# Patient Record
Sex: MALE | URBAN - NONMETROPOLITAN AREA
[De-identification: names, ages, dates, MRNs, and addresses within clinical notes are randomized per-mention and may not be internally consistent; named-entity substitution may affect disease eponyms.]

---

## 2019-05-24 ENCOUNTER — NURSE ONLY (OUTPATIENT)
Dept: LAB | Age: 39
End: 2019-05-24

## 2019-05-24 LAB
CHOLESTEROL, TOTAL: 199 MG/DL (ref 100–199)
GLUCOSE FASTING: 102 MG/DL (ref 70–108)
HDLC SERPL-MCNC: 45 MG/DL
LDL CHOLESTEROL CALCULATED: 139 MG/DL
TRIGL SERPL-MCNC: 77 MG/DL (ref 0–199)

## 2021-08-05 ENCOUNTER — NURSE ONLY (OUTPATIENT)
Dept: LAB | Age: 41
End: 2021-08-05

## 2021-08-05 LAB
CHOLESTEROL, TOTAL: 250 MG/DL (ref 100–199)
GLUCOSE BLD-MCNC: 92 MG/DL (ref 70–108)
HDLC SERPL-MCNC: 46 MG/DL
LDL CHOLESTEROL CALCULATED: 177 MG/DL
TRIGL SERPL-MCNC: 137 MG/DL (ref 0–199)

## 2021-12-09 ENCOUNTER — HOSPITAL ENCOUNTER (INPATIENT)
Age: 41
LOS: 3 days | Discharge: HOME | DRG: 177 | End: 2021-12-12
Attending: STUDENT IN AN ORGANIZED HEALTH CARE EDUCATION/TRAINING PROGRAM | Admitting: INTERNAL MEDICINE
Payer: COMMERCIAL

## 2021-12-09 ENCOUNTER — APPOINTMENT (OUTPATIENT)
Dept: GENERAL RADIOLOGY | Age: 41
DRG: 177 | End: 2021-12-09
Payer: COMMERCIAL

## 2021-12-09 DIAGNOSIS — J12.82 PNEUMONIA DUE TO COVID-19 VIRUS: Primary | ICD-10-CM

## 2021-12-09 DIAGNOSIS — R09.02 HYPOXIA: ICD-10-CM

## 2021-12-09 DIAGNOSIS — U07.1 PNEUMONIA DUE TO COVID-19 VIRUS: Primary | ICD-10-CM

## 2021-12-09 LAB
ANION GAP SERPL CALCULATED.3IONS-SCNC: 16 MEQ/L (ref 8–16)
BASOPHILS # BLD: 0 %
BASOPHILS ABSOLUTE: 0 THOU/MM3 (ref 0–0.1)
BUN BLDV-MCNC: 13 MG/DL (ref 7–22)
CALCIUM SERPL-MCNC: 8.9 MG/DL (ref 8.5–10.5)
CHLORIDE BLD-SCNC: 102 MEQ/L (ref 98–111)
CO2: 20 MEQ/L (ref 23–33)
CREAT SERPL-MCNC: 0.9 MG/DL (ref 0.4–1.2)
EOSINOPHIL # BLD: 0 %
EOSINOPHILS ABSOLUTE: 0 THOU/MM3 (ref 0–0.4)
ERYTHROCYTE [DISTWIDTH] IN BLOOD BY AUTOMATED COUNT: 12.7 % (ref 11.5–14.5)
ERYTHROCYTE [DISTWIDTH] IN BLOOD BY AUTOMATED COUNT: 42.1 FL (ref 35–45)
FERRITIN: 920 NG/ML (ref 22–322)
GFR SERPL CREATININE-BSD FRML MDRD: > 90 ML/MIN/1.73M2
GLUCOSE BLD-MCNC: 125 MG/DL (ref 70–108)
HCT VFR BLD CALC: 44.8 % (ref 42–52)
HEMOGLOBIN: 14.8 GM/DL (ref 14–18)
IMMATURE GRANS (ABS): 0.03 THOU/MM3 (ref 0–0.07)
IMMATURE GRANULOCYTES: 0.4 %
LD: 564 U/L (ref 100–190)
LYMPHOCYTES # BLD: 8.8 %
LYMPHOCYTES ABSOLUTE: 0.7 THOU/MM3 (ref 1–4.8)
MCH RBC QN AUTO: 30 PG (ref 26–33)
MCHC RBC AUTO-ENTMCNC: 33 GM/DL (ref 32.2–35.5)
MCV RBC AUTO: 90.9 FL (ref 80–94)
MONOCYTES # BLD: 5.5 %
MONOCYTES ABSOLUTE: 0.5 THOU/MM3 (ref 0.4–1.3)
NUCLEATED RED BLOOD CELLS: 0 /100 WBC
OSMOLALITY CALCULATION: 277.3 MOSMOL/KG (ref 275–300)
PLATELET # BLD: 320 THOU/MM3 (ref 130–400)
PMV BLD AUTO: 8.8 FL (ref 9.4–12.4)
POTASSIUM REFLEX MAGNESIUM: 3.9 MEQ/L (ref 3.5–5.2)
PROCALCITONIN: 0.16 NG/ML (ref 0.01–0.09)
RBC # BLD: 4.93 MILL/MM3 (ref 4.7–6.1)
SEG NEUTROPHILS: 85.3 %
SEGMENTED NEUTROPHILS ABSOLUTE COUNT: 7 THOU/MM3 (ref 1.8–7.7)
SODIUM BLD-SCNC: 138 MEQ/L (ref 135–145)
TROPONIN T: < 0.01 NG/ML
WBC # BLD: 8.2 THOU/MM3 (ref 4.8–10.8)

## 2021-12-09 PROCEDURE — 85025 COMPLETE CBC W/AUTO DIFF WBC: CPT

## 2021-12-09 PROCEDURE — 83615 LACTATE (LD) (LDH) ENZYME: CPT

## 2021-12-09 PROCEDURE — 82728 ASSAY OF FERRITIN: CPT

## 2021-12-09 PROCEDURE — 71046 X-RAY EXAM CHEST 2 VIEWS: CPT

## 2021-12-09 PROCEDURE — 6360000002 HC RX W HCPCS: Performed by: STUDENT IN AN ORGANIZED HEALTH CARE EDUCATION/TRAINING PROGRAM

## 2021-12-09 PROCEDURE — 93005 ELECTROCARDIOGRAM TRACING: CPT | Performed by: STUDENT IN AN ORGANIZED HEALTH CARE EDUCATION/TRAINING PROGRAM

## 2021-12-09 PROCEDURE — 84484 ASSAY OF TROPONIN QUANT: CPT

## 2021-12-09 PROCEDURE — 99205 OFFICE O/P NEW HI 60 MIN: CPT

## 2021-12-09 PROCEDURE — 84145 PROCALCITONIN (PCT): CPT

## 2021-12-09 PROCEDURE — 99223 1ST HOSP IP/OBS HIGH 75: CPT | Performed by: PHYSICIAN ASSISTANT

## 2021-12-09 PROCEDURE — 6370000000 HC RX 637 (ALT 250 FOR IP): Performed by: STUDENT IN AN ORGANIZED HEALTH CARE EDUCATION/TRAINING PROGRAM

## 2021-12-09 PROCEDURE — 2580000003 HC RX 258: Performed by: STUDENT IN AN ORGANIZED HEALTH CARE EDUCATION/TRAINING PROGRAM

## 2021-12-09 PROCEDURE — 1200000003 HC TELEMETRY R&B

## 2021-12-09 PROCEDURE — 94640 AIRWAY INHALATION TREATMENT: CPT

## 2021-12-09 PROCEDURE — 80048 BASIC METABOLIC PNL TOTAL CA: CPT

## 2021-12-09 PROCEDURE — 99283 EMERGENCY DEPT VISIT LOW MDM: CPT

## 2021-12-09 PROCEDURE — 36415 COLL VENOUS BLD VENIPUNCTURE: CPT

## 2021-12-09 PROCEDURE — 94760 N-INVAS EAR/PLS OXIMETRY 1: CPT

## 2021-12-09 PROCEDURE — 2700000000 HC OXYGEN THERAPY PER DAY

## 2021-12-09 RX ORDER — DEXAMETHASONE SODIUM PHOSPHATE 4 MG/ML
10 INJECTION, SOLUTION INTRA-ARTICULAR; INTRALESIONAL; INTRAMUSCULAR; INTRAVENOUS; SOFT TISSUE ONCE
Status: DISCONTINUED | OUTPATIENT
Start: 2021-12-09 | End: 2021-12-09

## 2021-12-09 RX ORDER — IPRATROPIUM BROMIDE AND ALBUTEROL SULFATE 2.5; .5 MG/3ML; MG/3ML
1 SOLUTION RESPIRATORY (INHALATION) ONCE
Status: COMPLETED | OUTPATIENT
Start: 2021-12-09 | End: 2021-12-09

## 2021-12-09 RX ORDER — DEXAMETHASONE SODIUM PHOSPHATE 4 MG/ML
6 INJECTION, SOLUTION INTRA-ARTICULAR; INTRALESIONAL; INTRAMUSCULAR; INTRAVENOUS; SOFT TISSUE ONCE
Status: COMPLETED | OUTPATIENT
Start: 2021-12-09 | End: 2021-12-09

## 2021-12-09 RX ORDER — 0.9 % SODIUM CHLORIDE 0.9 %
1000 INTRAVENOUS SOLUTION INTRAVENOUS ONCE
Status: COMPLETED | OUTPATIENT
Start: 2021-12-09 | End: 2021-12-09

## 2021-12-09 RX ADMIN — SODIUM CHLORIDE 1000 ML: 9 INJECTION, SOLUTION INTRAVENOUS at 12:13

## 2021-12-09 RX ADMIN — DEXAMETHASONE SODIUM PHOSPHATE 6 MG: 4 INJECTION, SOLUTION INTRA-ARTICULAR; INTRALESIONAL; INTRAMUSCULAR; INTRAVENOUS; SOFT TISSUE at 12:13

## 2021-12-09 RX ADMIN — IPRATROPIUM BROMIDE AND ALBUTEROL SULFATE 1 AMPULE: .5; 3 SOLUTION RESPIRATORY (INHALATION) at 13:21

## 2021-12-09 ASSESSMENT — ENCOUNTER SYMPTOMS
RHINORRHEA: 0
ABDOMINAL PAIN: 0
SHORTNESS OF BREATH: 1
WHEEZING: 0
BACK PAIN: 0
COUGH: 1
NAUSEA: 0
DIARRHEA: 1
RHINORRHEA: 1
EYE REDNESS: 0
SINUS PAIN: 0
VOMITING: 0
COLOR CHANGE: 0
SORE THROAT: 0

## 2021-12-09 NOTE — ED NOTES
Patient resting in bed. Respirations easy and unlabored. No distress noted. Call light within reach.        Sissy Lagos RN  12/09/21 3907

## 2021-12-09 NOTE — ED PROVIDER NOTES
[address] [facility] MEDICINE ATTENDING ATTESTATION      Evaluation of Cabrera Motta. Case discussed and care plan developed with resident physician. I agree with the resident physician documentation and plan as documented by him, except if my documentation differs. Patient seen, interviewed and examined by me. I reviewed the medical, surgical, family and social history, medications and allergies. I have reviewed the nursing documentation. I have reviewed the patient's vital signs and are abnormal from Mild tachycardia and tachypnea per my interpretation. There is no height or weight on file to calculate BMI. Pulsoxymetry is abnormal per my interpretation. Brief H&P   Patient c/o Covid-like symptoms for 10 days, desaturated to 88% with ambulation at urgent care and was referred to the emergency department    Physical exam is notable for ill-appearing, tachypnea, diminished breath sounds in bilateral bases, tachycardia, dry mucous membranes      Medical Decision Making   MDM:   Patient is a 78-year-old male who presents with 10 days of Covid symptoms. Patient with tachycardia, tachypnea, hypoxia to 88% on room air requiring 2 L supplemental oxygen. Patient given IV fluids, chest x-ray consistent with known Covid infection. Plan:    Admit for acute hypoxic respiratory failure secondary to Covid    Please see the resident physician completed note for final disposition except as documented on this attestation. I have reviewed and interpreted all available lab, radiology and ekg results available at the moment. Diagnosis, treatment and disposition plans were discussed and agreed upon by patient. This transcription was electronically signed. It was dictated by use of voice recognition software and electronically transcribed. The transcription may contain errors not detected in proofreading.      I performed direct supervision and was present for the critical portion following procedures: None  Critical care time on this case: None    Electronically signed by Danyelle Freed MD on 12/9/21 at 1:44 PM EST       Danyelle Freed MD  12/09/21 3908

## 2021-12-09 NOTE — ED PROVIDER NOTES
[facility] [address] [address] EMERGENCY DEPT  Urgent Care Encounter       CHIEF COMPLAINT       Chief Complaint   Patient presents with    Positive For Covid-19     shortness of breath       Nurses Notes reviewed and I agree except as noted in the HPI. HISTORY OF PRESENT ILLNESS   Ravi Saavedra is a 37 y.o. male who presents for cough, shortness of breath, fatigue, decreased energy. Symptoms for approximately 10 days but the symptoms have gotten worse today. Patient is Covid positive. He has tested positive a week ago. He has not had any respiratory distress until today. HPI    REVIEW OF SYSTEMS     Review of Systems   Constitutional: Positive for fatigue and fever. HENT: Positive for congestion and rhinorrhea. Respiratory: Positive for cough and shortness of breath. Negative for wheezing. Cardiovascular: Negative for chest pain. Skin: Negative for color change. Neurological: Positive for headaches. Negative for dizziness. PAST MEDICAL HISTORY   History reviewed. No pertinent past medical history. SURGICALHISTORY     Patient  has no past surgical history on file. CURRENT MEDICATIONS       Previous Medications    No medications on file       ALLERGIES     Patient is has No Known Allergies. Patients   There is no immunization history on file for this patient. FAMILY HISTORY     Patient's family history is not on file. SOCIAL HISTORY     Patient  reports that he has never smoked. He has never used smokeless tobacco. He reports previous alcohol use. He reports previous drug use. PHYSICAL EXAM     ED TRIAGE VITALS  BP: 108/67, Temp: 98.9 °F (37.2 °C), Pulse: 112, Resp: 26, SpO2: 93 %,There is no height or weight on file to calculate BMI.,No LMP for male patient. Physical Exam  Constitutional:       Appearance: He is normal weight. He is ill-appearing. He is not toxic-appearing. HENT:      Head: Normocephalic. Cardiovascular:      Rate and Rhythm: Tachycardia present.       Pulses: Normal pulses. Heart sounds: Normal heart sounds. Pulmonary:      Effort: Tachypnea present. No respiratory distress. Breath sounds: Decreased air movement present. Decreased breath sounds present. Comments: Few scattered end expiratory wheezes noted throughout, diminished breath sounds throughout  Musculoskeletal:      Cervical back: Normal range of motion. Skin:     General: Skin is warm. Capillary Refill: Capillary refill takes less than 2 seconds. Neurological:      General: No focal deficit present. Mental Status: He is alert. DIAGNOSTIC RESULTS     Labs:No results found for this visit on 12/09/21. IMAGING:    XR CHEST (2 VW)   Final Result   Multifocal interstitial and alveolar opacities consistent with pneumonia the appropriate clinical setting. Follow-up to ensure resolution is advised. **This report has been created using voice recognition software. It may contain minor errors which are inherent in voice recognition technology. **      Final report electronically signed by Dr Иван Maharaj on 12/9/2021 11:09 AM            EKG:      URGENT CARE COURSE:     Vitals:    12/09/21 1033   BP: 108/67   Pulse: 112   Resp: 26   Temp: 98.9 °F (37.2 °C)   TempSrc: Temporal   SpO2: 93%   Weight: 200 lb (90.7 kg)       Medications - No data to display         PROCEDURES:  None    FINAL IMPRESSION      1. Pneumonia due to COVID-19 virus    2. Hypoxia          DISPOSITION/ PLAN     Patient presents for dyspnea, hypoxia. Patient is Covid positive. Chest x-ray today shows multifocal interstitial and alveolar opacities significant for pneumonia. Patient has respirations 26 to 28/min. O2 saturation at rest 93%. Patient needs further evaluation for his hypoxia. He may benefit from Regeneron although this is day 10 of his symptoms. After discussion, the patient is amenable to being transferred to [facility] emergency department for further evaluation.   I did contact

## 2021-12-09 NOTE — PROGRESS NOTES
Patient released in stable condition. Instructed to go directly to [facility] emergency department for further evaluation and treatment. Patient verbalized understanding. Ambulated, per self, to private car.

## 2021-12-09 NOTE — ED TRIAGE NOTES
Presents to ED from urgent care with c/o sob. Patient had positive covid test 9 days ago. Ambulatory pulse ox 88%.

## 2021-12-09 NOTE — ED PROVIDER NOTES
[facility] ENCOUNTER          Pt Name: Iliana Whyte  MRN: [id number]  [place] 1980  Date of evaluation: 12/9/2021  Treating Resident Physician: Brian Moser MD  Supervising Physician: Dr Zeus Valenzuela       Chief Complaint   Patient presents with    Positive For Covid-19     shortness of breath     History obtained from the patient. HISTORY OF PRESENT ILLNESS    HPI  Iliana Whyte is a 37 y.o. male with no past medical history who was seen in urgent care prior to arrival here for his symptoms and sent here due to him being hypoxic to 88% when ambulating requiring supplemental oxygenation. Patient began having symptoms of fatigue myalgias fever chills and loose/watery diarrhea over the past 10 days, tested positive for COVID-19 9 days ago. However over the past 2 days has had worsening dyspnea exertion shortness of breath and nonproductive cough. Denies any chest pain palpitations or abdominal pain. The patient has no other acute complaints at this time. REVIEW OF SYSTEMS   Review of Systems   Constitutional: Positive for chills, fatigue and fever. HENT: Negative for rhinorrhea, sinus pain and sore throat. Eyes: Negative for redness. Respiratory: Positive for cough and shortness of breath. Cardiovascular: Negative for chest pain. Gastrointestinal: Positive for diarrhea. Negative for abdominal pain, nausea and vomiting. Genitourinary: Negative for dysuria. Musculoskeletal: Negative for back pain. Skin: Negative for rash. Neurological: Negative for light-headedness and headaches. Psychiatric/Behavioral: Negative for agitation. PAST MEDICAL AND SURGICAL HISTORY   History reviewed. No pertinent past medical history. History reviewed. No pertinent surgical history. MEDICATIONS   No current facility-administered medications for this encounter.   No current outpatient medications on file. SOCIAL HISTORY     Social History     Social History Narrative    Not on file     Social History     Tobacco Use    Smoking status: Never Smoker    Smokeless tobacco: Never Used   Substance Use Topics    Alcohol use: Not Currently    Drug use: Not Currently         ALLERGIES   No Known Allergies      FAMILY HISTORY   History reviewed. No pertinent family history. PREVIOUS RECORDS   Previous records reviewed: This is a patient's first time visiting this emergency department. PHYSICAL EXAM     ED Triage Vitals [12/09/21 1033]   BP Temp Temp Source Pulse Resp SpO2 Height Weight   108/67 98.9 °F (37.2 °C) Temporal 112 26 93 % -- 200 lb (90.7 kg)     Initial vital signs and nursing assessment reviewed and abnormal from Tachycardia, tachypnea. Pulsoximetry is abnormal per my interpretation. Additional Vital Signs:  Vitals:    12/09/21 1321   BP:    Pulse:    Resp:    Temp:    SpO2: (P) 96%       Physical Exam  Vitals and nursing note reviewed. Constitutional:       General: He is in acute distress. Appearance: He is ill-appearing. HENT:      Head: Normocephalic and atraumatic. Right Ear: External ear normal.      Left Ear: External ear normal.      Nose: Nose normal.      Mouth/Throat:      Mouth: Mucous membranes are dry. Pharynx: Oropharynx is clear. Eyes:      General: No scleral icterus. Conjunctiva/sclera: Conjunctivae normal.   Cardiovascular:      Rate and Rhythm: Regular rhythm. Tachycardia present. Pulses: Normal pulses. Heart sounds: Normal heart sounds. Pulmonary:      Effort: Respiratory distress present. Comments: Tachypnea, diminished bilaterally  Abdominal:      General: Abdomen is flat. There is no distension. Palpations: Abdomen is soft. Tenderness: There is no abdominal tenderness. There is no guarding or rebound. Musculoskeletal:         General: Normal range of motion.       Cervical back: Normal range of motion and neck supple. No rigidity or tenderness. No muscular tenderness. Lymphadenopathy:      Cervical: No cervical adenopathy. Skin:     General: Skin is warm and dry. Capillary Refill: Capillary refill takes less than 2 seconds. Coloration: Skin is not jaundiced. Neurological:      General: No focal deficit present. Mental Status: He is oriented to person, place, and time. Psychiatric:         Mood and Affect: Mood normal.         Behavior: Behavior normal.         MEDICAL DECISION MAKING   Initial Assessment: This is a 20-year-old male with no significant past medical history who is not vaccinated against COVID-19 who presents with fatigue myalgias fever and chills over the past 10 days, tested positive for COVID-19 9 days ago, had worsening shortness of breath nonproductive cough and dyspnea on exertion over the last 2 days. Was seen in urgent care and was hypoxic to 88% and was sent here for evaluation. Here he is hypoxic as well as tachypneic to the 30s to 40s. Requiring supplemental oxygenation. Differential Diagnosis Included but not limited to: COVID-19, superimposed bacterial pneumonia, ACS, pleural effusions    MDM:   Due to patient's COVID-19 symptoms and hypoxia requiring supplemental oxygenation will be admitted for further management work-up. He has negative troponin. No signs of superimposed back to pneumonia at this moment. He was given 6 mg of Decadron as well as a breathing treatment.         ED RESULTS   Laboratory results:  Labs Reviewed   CBC WITH AUTO DIFFERENTIAL - Abnormal; Notable for the following components:       Result Value    MPV 8.8 (*)     Lymphocytes Absolute 0.7 (*)     All other components within normal limits   BASIC METABOLIC PANEL W/ REFLEX TO MG FOR LOW K - Abnormal; Notable for the following components:    CO2 20 (*)     Glucose 125 (*)     All other components within normal limits   PROCALCITONIN - Abnormal; Notable for the following components:    Procalcitonin 0.16 (*)     All other components within normal limits   FERRITIN - Abnormal; Notable for the following components:    Ferritin 920 (*)     All other components within normal limits   LACTATE DEHYDROGENASE - Abnormal; Notable for the following components:     (*)     All other components within normal limits   TROPONIN   ANION GAP   GLOMERULAR FILTRATION RATE, ESTIMATED   OSMOLALITY       Radiologic studies results:  XR CHEST (2 VW)   Final Result   Multifocal interstitial and alveolar opacities consistent with pneumonia the appropriate clinical setting. Follow-up to ensure resolution is advised. **This report has been created using voice recognition software. It may contain minor errors which are inherent in voice recognition technology. **      Final report electronically signed by Dr Zhou Mota on 12/9/2021 11:09 AM          ED Medications administered this visit:   Medications   dexamethasone (DECADRON) injection 6 mg (6 mg IntraVENous Given 12/9/21 1213)   0.9 % sodium chloride bolus (1,000 mLs IntraVENous New Bag 12/9/21 1213)   ipratropium-albuterol (DUONEB) nebulizer solution 1 ampule (1 ampule Inhalation Given 12/9/21 1321)         ED COURSE     ED Course as of 12/09/21 1325   Thu Dec 09, 2021   1144 XR CHEST (2 VW)  \"IMPRESSION:  Multifocal interstitial and alveolar opacities consistent with pneumonia the appropriate clinical setting.  Follow-up to ensure resolution is advised.   \" [AL]   0653 WBC: 8.2 [AL]   1230 Hemoglobin Quant: 14.8 [AL]   1230 Hematocrit: 44.8 [AL]   1230 Platelet Count: 177 [AL]   1247 Procalcitonin(!): 0.16 [AL]   1247 Ferritin(!): 920 [AL]   1247 Troponin T: < 0.010 [AL]   1312 CO2(!): 20 [AL]   1312 Glucose(!): 125 [AL]   1313 LD(!): 564 [AL]      ED Course User Index  [AL] Harper Argueta MD       MEDICATION CHANGES     New Prescriptions    No medications on file         FINAL DISPOSITION     Final diagnoses:   Pneumonia due to COVID-19 virus   Hypoxia     Condition: condition: fair  Dispo: Admit to telemetry      This transcription was electronically signed. Parts of this transcriptions may have been dictated by use of voice recognition software and electronically transcribed, and parts may have been transcribed with the assistance of an ED scribe. The transcription may contain errors not detected in proofreading. Please refer to my supervising physician's documentation if my documentation differs.     Electronically Signed: Delma Rebolledo MD, 12/09/21, 1:25 PM         Delma Rebolledo MD  Resident  12/09/21 0977

## 2021-12-09 NOTE — ED TRIAGE NOTES
Patient to room from registration. C/o increased shortness of breath and nonproductive cough beginning this morning, upon waking. Day 9 of symptoms. Previously positive for COVID.

## 2021-12-09 NOTE — H&P
Hospitalist - History & Physical      Patient: Sai Thomas    Unit/Bed:12/012A  YOB: 1980  MRN: [id number]   Acct: [de-identified]   PCP: Jenn Zavaleta MD    Date of Service: Pt seen/examined on 12/09/21  and Admitted to Inpatient with expected LOS greater than two midnights due to medical therapy. Chief Complaint:  SOB    Assessment and Plan:-  COVID 19 infection: Patient tested positive for COVID-19 on 11/30 with a home test.  Pt met inpatient criteria. Started patient on Decadron 6 mg, continue daily. Pharmacy consulted to help with dosages of these medications. Vitamins & supplements started. Concern for superimposed bacterial infection, start Zithromax/Rocephin. Blood cx x2. Supportive care. Acute respiratory failure, hypoxia: Patient's baseline is room air. Patient is currently requiring 2 L of oxygen. Incentive spirometry. Acapella. CXR shows multifocal interstitial and alveolar opacities consistent with pneumonia. Non AG metabolic acidosis: Likely secondary to GI losses, repeat BMP and monitor  History of tobacco use: continue to encourage abstention        History Of Present Illness:    Pt is a 37 male with no significant PMH on file that presents to [facility] ED with chief complaint of SOB that began on Wednesday. History is obtained via patient. Pt was diagnosed with COVID-19 on home test on 11/30. At that time patient was having subjective fevers and chills, body aches, headaches, sore throat, congestion and general malaise. Patient was in isolation at home, no known sick contacts. However beginning Wednesday he began to feel acutely short of breath and was seen at the urgent care where they told him he needed to go to the ED. Pt reports that when he gets up to ambulate his SPO2 drops into the 80s. Patient denies any chest pain. He does admit to a cough productive of yellowish-green sputum.   Denies any abdominal pain, however does report decreased appetite, reduced oral intake, and diarrhea. No abdominal pain or nausea / vomiting. Patient admitted to [facility] unit. Past Medical History:    History reviewed. No pertinent past medical history. Past Surgical History:    History reviewed. No pertinent surgical history. Home Medications:   No current facility-administered medications on file prior to encounter. No current outpatient medications on file prior to encounter. Allergies:    Patient has no known allergies. Social History:    reports that he has never smoked. He has never used smokeless tobacco. He reports previous alcohol use. He reports previous drug use. Family History:   History reviewed. No pertinent family history. Diet:  No diet orders on file    Review of systems:   Pertinent positives as noted in the HPI. All other systems reviewed and negative. PHYSICAL EXAM:  /81   Pulse 99   Temp 98.9 °F (37.2 °C) (Temporal)   Resp 29   Wt 200 lb (90.7 kg)   SpO2 96%   General appearance: Acutely ill-appearing, diaphoretic, no apparent distress, appears stated age and cooperative. HEENT: Normal cephalic, atraumatic without obvious deformity. Pupils equal, round, and reactive to light. Extra ocular muscles intact. Conjunctivae/corneas clear. Neck: Supple, with full range of motion. No jugular venous distention. Trachea midline. Respiratory: Increased respiratory effort. Diminished to auscultation, bilaterally without Rales/Wheezes/Rhonchi. Cardiovascular: Regular rate and rhythm with normal S1/S2 without murmurs, rubs or gallops. Abdomen: Soft, non-tender, non-distended with normal bowel sounds. Musculoskeletal:  No clubbing, cyanosis or edema bilaterally. Skin: Skin color, texture, turgor normal.  No rashes or lesions. Neurologic:  Neurovascularly intact without any focal sensory/motor deficits.  Cranial nerves: II-XII intact, grossly non-focal.  Psychiatric: Alert and oriented x4, thought content appropriate, normal insight  Capillary Refill: Brisk,< 3 seconds   Peripheral Pulses: +2 palpable, equal bilaterally     Labs:   Recent Labs     12/09/21  1202   WBC 8.2   HGB 14.8   HCT 44.8        Recent Labs     12/09/21  1202      K 3.9      CO2 20*   BUN 13   CREATININE 0.9   CALCIUM 8.9     No results for input(s): AST, ALT, BILIDIR, BILITOT, ALKPHOS in the last 72 hours. No results for input(s): INR in the last 72 hours. No results for input(s): Adonica Hoose in the last 72 hours. Urinalysis:    No results found for: Saralyn Pak, BACTERIA, RBCUA, BLOODU, SPECGRAV, Rebeca [place] [address]    Radiology:   XR CHEST (2 VW)   Final Result   Multifocal interstitial and alveolar opacities consistent with pneumonia the appropriate clinical setting. Follow-up to ensure resolution is advised. **This report has been created using voice recognition software. It may contain minor errors which are inherent in voice recognition technology. **      Final report electronically signed by Dr Mahogany Gan on 12/9/2021 11:09 AM        XR CHEST (2 VW)    Result Date: 12/9/2021  PROCEDURE: XR CHEST (2 VW) CLINICAL INFORMATION: Worsening shortness of breath. Covid positive. COMPARISON: No prior study. TECHNIQUE: PA and lateral views of the chest performed. FINDINGS: Lines/tubes: None. Heart/mediastinum: The heart size is normal. The pulmonary vascularity is unremarkable. Lungs: Multifocal interstitial and alveolar opacities are most pronounced in the mid and lower lung zones. Low lung volumes are present. No pleural effusion or pneumothorax is identified. Bones: The visualized skeletal structures appear intact. Multifocal interstitial and alveolar opacities consistent with pneumonia the appropriate clinical setting. Follow-up to ensure resolution is advised. **This report has been created using voice recognition software. It may contain minor errors which are inherent in voice recognition technology. ** Final report electronically signed by Dr Yung Rasmussen on 12/9/2021 11:09 AM        EKG: NSR    Electronically signed by Radha Gaines PA-C on 12/9/2021 at 1:26 PM

## 2021-12-10 LAB
ANION GAP SERPL CALCULATED.3IONS-SCNC: 14 MEQ/L (ref 8–16)
BUN BLDV-MCNC: 15 MG/DL (ref 7–22)
C-REACTIVE PROTEIN: 4.79 MG/DL (ref 0–1)
CALCIUM SERPL-MCNC: 9 MG/DL (ref 8.5–10.5)
CHLORIDE BLD-SCNC: 105 MEQ/L (ref 98–111)
CO2: 22 MEQ/L (ref 23–33)
CREAT SERPL-MCNC: 0.7 MG/DL (ref 0.4–1.2)
EKG ATRIAL RATE: 97 BPM
EKG P AXIS: 41 DEGREES
EKG P-R INTERVAL: 144 MS
EKG Q-T INTERVAL: 310 MS
EKG QRS DURATION: 88 MS
EKG QTC CALCULATION (BAZETT): 393 MS
EKG R AXIS: 39 DEGREES
EKG T AXIS: 17 DEGREES
EKG VENTRICULAR RATE: 97 BPM
ERYTHROCYTE [DISTWIDTH] IN BLOOD BY AUTOMATED COUNT: 12.7 % (ref 11.5–14.5)
ERYTHROCYTE [DISTWIDTH] IN BLOOD BY AUTOMATED COUNT: 42.5 FL (ref 35–45)
GFR SERPL CREATININE-BSD FRML MDRD: > 90 ML/MIN/1.73M2
GLUCOSE BLD-MCNC: 133 MG/DL (ref 70–108)
HCT VFR BLD CALC: 43.6 % (ref 42–52)
HEMOGLOBIN: 14.7 GM/DL (ref 14–18)
MCH RBC QN AUTO: 30.8 PG (ref 26–33)
MCHC RBC AUTO-ENTMCNC: 33.7 GM/DL (ref 32.2–35.5)
MCV RBC AUTO: 91.2 FL (ref 80–94)
OSMOLALITY CALCULATION: 284 MOSMOL/KG (ref 275–300)
PLATELET # BLD: 375 THOU/MM3 (ref 130–400)
PMV BLD AUTO: 8.8 FL (ref 9.4–12.4)
POTASSIUM SERPL-SCNC: 4 MEQ/L (ref 3.5–5.2)
RBC # BLD: 4.78 MILL/MM3 (ref 4.7–6.1)
SODIUM BLD-SCNC: 141 MEQ/L (ref 135–145)
WBC # BLD: 10.5 THOU/MM3 (ref 4.8–10.8)

## 2021-12-10 PROCEDURE — 80048 BASIC METABOLIC PNL TOTAL CA: CPT

## 2021-12-10 PROCEDURE — 6370000000 HC RX 637 (ALT 250 FOR IP): Performed by: PHYSICIAN ASSISTANT

## 2021-12-10 PROCEDURE — 93010 ELECTROCARDIOGRAM REPORT: CPT | Performed by: NUCLEAR MEDICINE

## 2021-12-10 PROCEDURE — 87040 BLOOD CULTURE FOR BACTERIA: CPT

## 2021-12-10 PROCEDURE — 86140 C-REACTIVE PROTEIN: CPT

## 2021-12-10 PROCEDURE — 6360000002 HC RX W HCPCS: Performed by: PHYSICIAN ASSISTANT

## 2021-12-10 PROCEDURE — 2580000003 HC RX 258: Performed by: PHYSICIAN ASSISTANT

## 2021-12-10 PROCEDURE — 36415 COLL VENOUS BLD VENIPUNCTURE: CPT

## 2021-12-10 PROCEDURE — 85027 COMPLETE CBC AUTOMATED: CPT

## 2021-12-10 PROCEDURE — 1200000003 HC TELEMETRY R&B

## 2021-12-10 PROCEDURE — 99232 SBSQ HOSP IP/OBS MODERATE 35: CPT | Performed by: PHYSICIAN ASSISTANT

## 2021-12-10 RX ORDER — VITAMIN B COMPLEX
2000 TABLET ORAL DAILY
Status: DISCONTINUED | OUTPATIENT
Start: 2021-12-10 | End: 2021-12-12 | Stop reason: HOSPADM

## 2021-12-10 RX ORDER — ALBUTEROL SULFATE 90 UG/1
2 AEROSOL, METERED RESPIRATORY (INHALATION) EVERY 4 HOURS PRN
Status: DISCONTINUED | OUTPATIENT
Start: 2021-12-10 | End: 2021-12-12 | Stop reason: HOSPADM

## 2021-12-10 RX ORDER — DEXAMETHASONE 4 MG/1
6 TABLET ORAL DAILY
Status: DISCONTINUED | OUTPATIENT
Start: 2021-12-10 | End: 2021-12-12 | Stop reason: HOSPADM

## 2021-12-10 RX ORDER — ACETAMINOPHEN 650 MG/1
650 SUPPOSITORY RECTAL EVERY 6 HOURS PRN
Status: DISCONTINUED | OUTPATIENT
Start: 2021-12-10 | End: 2021-12-12 | Stop reason: HOSPADM

## 2021-12-10 RX ORDER — SODIUM CHLORIDE 0.9 % (FLUSH) 0.9 %
5-40 SYRINGE (ML) INJECTION EVERY 12 HOURS SCHEDULED
Status: DISCONTINUED | OUTPATIENT
Start: 2021-12-10 | End: 2021-12-12 | Stop reason: HOSPADM

## 2021-12-10 RX ORDER — ACETAMINOPHEN 325 MG/1
650 TABLET ORAL EVERY 6 HOURS PRN
Status: DISCONTINUED | OUTPATIENT
Start: 2021-12-10 | End: 2021-12-12 | Stop reason: HOSPADM

## 2021-12-10 RX ORDER — ZINC GLUCONATE 50 MG
50 TABLET ORAL DAILY
Status: DISCONTINUED | OUTPATIENT
Start: 2021-12-10 | End: 2021-12-10 | Stop reason: SDUPTHER

## 2021-12-10 RX ORDER — ONDANSETRON 2 MG/ML
4 INJECTION INTRAMUSCULAR; INTRAVENOUS EVERY 6 HOURS PRN
Status: DISCONTINUED | OUTPATIENT
Start: 2021-12-10 | End: 2021-12-12 | Stop reason: HOSPADM

## 2021-12-10 RX ORDER — ASCORBIC ACID 500 MG
500 TABLET ORAL DAILY
Status: DISCONTINUED | OUTPATIENT
Start: 2021-12-10 | End: 2021-12-12 | Stop reason: HOSPADM

## 2021-12-10 RX ORDER — ONDANSETRON 4 MG/1
4 TABLET, ORALLY DISINTEGRATING ORAL EVERY 8 HOURS PRN
Status: DISCONTINUED | OUTPATIENT
Start: 2021-12-10 | End: 2021-12-12 | Stop reason: HOSPADM

## 2021-12-10 RX ORDER — POLYETHYLENE GLYCOL 3350 17 G/17G
17 POWDER, FOR SOLUTION ORAL DAILY PRN
Status: DISCONTINUED | OUTPATIENT
Start: 2021-12-10 | End: 2021-12-12 | Stop reason: HOSPADM

## 2021-12-10 RX ORDER — SODIUM CHLORIDE 9 MG/ML
INJECTION, SOLUTION INTRAVENOUS CONTINUOUS
Status: DISCONTINUED | OUTPATIENT
Start: 2021-12-10 | End: 2021-12-11

## 2021-12-10 RX ORDER — 0.9 % SODIUM CHLORIDE 0.9 %
1000 INTRAVENOUS SOLUTION INTRAVENOUS ONCE
Status: COMPLETED | OUTPATIENT
Start: 2021-12-10 | End: 2021-12-10

## 2021-12-10 RX ORDER — SODIUM CHLORIDE 9 MG/ML
25 INJECTION, SOLUTION INTRAVENOUS PRN
Status: DISCONTINUED | OUTPATIENT
Start: 2021-12-10 | End: 2021-12-12 | Stop reason: HOSPADM

## 2021-12-10 RX ORDER — SODIUM CHLORIDE 0.9 % (FLUSH) 0.9 %
5-40 SYRINGE (ML) INJECTION PRN
Status: DISCONTINUED | OUTPATIENT
Start: 2021-12-10 | End: 2021-12-12 | Stop reason: HOSPADM

## 2021-12-10 RX ORDER — ZINC SULFATE 50(220)MG
50 CAPSULE ORAL DAILY
Status: DISCONTINUED | OUTPATIENT
Start: 2021-12-10 | End: 2021-12-12 | Stop reason: HOSPADM

## 2021-12-10 RX ORDER — GUAIFENESIN 600 MG/1
600 TABLET, EXTENDED RELEASE ORAL 2 TIMES DAILY
Status: DISCONTINUED | OUTPATIENT
Start: 2021-12-10 | End: 2021-12-12 | Stop reason: HOSPADM

## 2021-12-10 RX ADMIN — GUAIFENESIN 600 MG: 600 TABLET, EXTENDED RELEASE ORAL at 11:35

## 2021-12-10 RX ADMIN — ENOXAPARIN SODIUM 30 MG: 100 INJECTION SUBCUTANEOUS at 14:10

## 2021-12-10 RX ADMIN — CEFTRIAXONE SODIUM 1000 MG: 1 INJECTION, POWDER, FOR SOLUTION INTRAMUSCULAR; INTRAVENOUS at 11:21

## 2021-12-10 RX ADMIN — GUAIFENESIN 600 MG: 600 TABLET, EXTENDED RELEASE ORAL at 21:44

## 2021-12-10 RX ADMIN — ENOXAPARIN SODIUM 30 MG: 100 INJECTION SUBCUTANEOUS at 23:57

## 2021-12-10 RX ADMIN — SODIUM CHLORIDE, PRESERVATIVE FREE 10 ML: 5 INJECTION INTRAVENOUS at 11:40

## 2021-12-10 RX ADMIN — Medication 50 MG: at 14:10

## 2021-12-10 RX ADMIN — SODIUM CHLORIDE: 9 INJECTION, SOLUTION INTRAVENOUS at 11:37

## 2021-12-10 RX ADMIN — SODIUM CHLORIDE, PRESERVATIVE FREE 10 ML: 5 INJECTION INTRAVENOUS at 21:44

## 2021-12-10 RX ADMIN — BARICITINIB 4 MG: 2 TABLET, FILM COATED ORAL at 14:10

## 2021-12-10 RX ADMIN — AZITHROMYCIN DIHYDRATE 500 MG: 500 INJECTION, POWDER, LYOPHILIZED, FOR SOLUTION INTRAVENOUS at 11:52

## 2021-12-10 RX ADMIN — Medication 2000 UNITS: at 14:09

## 2021-12-10 RX ADMIN — OXYCODONE HYDROCHLORIDE AND ACETAMINOPHEN 500 MG: 500 TABLET ORAL at 14:10

## 2021-12-10 RX ADMIN — SODIUM CHLORIDE 1000 ML: 9 INJECTION, SOLUTION INTRAVENOUS at 11:27

## 2021-12-10 RX ADMIN — DEXAMETHASONE 6 MG: 4 TABLET ORAL at 11:19

## 2021-12-10 NOTE — PROGRESS NOTES
Baricitinib Initiation    This patient meets criteria for baricitinib. Criteria:  Age 2 years or greater  COVID-19 positive & hospitalized  Requiring supplemental oxygen, high flow nasal cannula, invasive or non-invasive ventilation, or ECMO  Any elevation in any of the following: CRP, D-dimer, ferritin, or LDH  Must also be on dexamethasone     Exclusions: If patient already received tocilizumab (due to long half-life)  Patients with active TB    Special Considerations (that warrant provider discussion): Active DVT or PE  Pregnancy  Severe hepatic impairment  Chronic/recurrent infections  Hemoglobin < 8.0  Chronically immunosuppressed patients (drug or disease etiology)    eGFR:  Recent Labs     12/09/21  1202 12/10/21  0923   LABGLOM >90 >90     Plan:  Start baricitinib 4 mg once daily. EUA Adult Dosing Reference for COVID-19 (FDA 2021):  eGFR ?60: No dosage adjustment necessary. eGFR 30 to <60: 2 mg once daily. eGFR 15 to <30: 1 mg once daily. eGFR <15: Use is not recommended. *Dosing is different for ages 3to 5years old.  (see Yessi)*

## 2021-12-10 NOTE — PROGRESS NOTES
Hospitalist Progress Note      Patient:  Dayday Post    Unit/Bed:39/039A  YOB: 1980  MRN: [id number]   Acct: [de-identified]   PCP: Malena Watson MD  Date of Admission: 12/9/2021    Assessment/Plan:    1. COVID 19 infection: Patient tested positive for COVID-19 on 11/30 with a home test.  Pt met inpatient criteria. Started patient on Decadron 6 mg, continue daily. Pharmacy consulted to help with dosages of these medications. Vitamins & supplements started. Concern for superimposed bacterial infection, start Zithromax/Rocephin. Blood cx x2. Supportive care. 2. Acute respiratory failure, hypoxia: Patient's baseline is room air. Patient is currently requiring 2 L of oxygen. Incentive spirometry. Acapella. CXR shows multifocal interstitial and alveolar opacities consistent with pneumonia. 3. Non AG metabolic acidosis: Likely secondary to GI losses, repeat BMP and monitor  4. History of tobacco use: continue to encourage abstention    Chief Complaint: SOB    Initial H and P:-    Pt is a 37 male with no significant PMH on file that presents to [facility] ED with chief complaint of SOB that began on Wednesday. History is obtained via patient.     Pt was diagnosed with COVID-19 on home test on 11/30. At that time patient was having subjective fevers and chills, body aches, headaches, sore throat, congestion and general malaise. Patient was in isolation at home, no known sick contacts. However beginning Wednesday he began to feel acutely short of breath and was seen at the urgent care where they told him he needed to go to the ED. Pt reports that when he gets up to ambulate his SPO2 drops into the 80s. Patient denies any chest pain. He does admit to a cough productive of yellowish-green sputum. Denies any abdominal pain, however does report decreased appetite, reduced oral intake, and diarrhea.   No abdominal pain or nausea / vomiting.     Patient admitted to [facility] unit. Subjective (past 24 hours):   12/10: Patient doing okay, lying in bed with wife at bedside. Patient reports continued cough with productive sputum. Continues to have shortness of breath, however saturating appropriately on 2 L. Past medical history, family history, social history and allergies reviewed again and is unchanged since admission. ROS (All review of systems completed. Pertinent positives noted. Otherwise All other systems reviewed and negative.)     Medications:  Reviewed    Infusion Medications    sodium chloride      sodium chloride       Scheduled Medications    sodium chloride flush  5-40 mL IntraVENous 2 times per day    enoxaparin  30 mg SubCUTAneous BID    dexamethasone  6 mg Oral Daily    Vitamin D  2,000 Units Oral Daily    ascorbic acid  500 mg Oral Daily    zinc  50 mg Oral Daily    sodium chloride  1,000 mL IntraVENous Once    azithromycin  500 mg IntraVENous Q24H    cefTRIAXone (ROCEPHIN) IV  1,000 mg IntraVENous Q24H     PRN Meds: sodium chloride flush, sodium chloride, ondansetron **OR** ondansetron, polyethylene glycol, acetaminophen **OR** acetaminophen, albuterol sulfate HFA    No intake or output data in the 24 hours ending 12/10/21 0913    Diet:  ADULT DIET; Regular    Exam:  /88   Pulse 86   Temp 98.9 °F (37.2 °C) (Temporal)   Resp 21   Wt 200 lb (90.7 kg)   SpO2 93%   General appearance: Acutely ill-appearing, diaphoretic, no apparent distress, appears stated age and cooperative. HEENT: Normal cephalic, atraumatic without obvious deformity. Pupils equal, round, and reactive to light. Extra ocular muscles intact. Conjunctivae/corneas clear. Neck: Supple, with full range of motion. No jugular venous distention. Trachea midline. Respiratory: Increased respiratory effort. Diminished to auscultation especially in bases, bilaterally without Rales/Wheezes/Rhonchi.   Cardiovascular: Regular rate and rhythm with normal S1/S2 without murmurs, rubs or gallops. Abdomen: Soft, non-tender, non-distended with normal bowel sounds. Musculoskeletal:  No clubbing, cyanosis or edema bilaterally. Skin: Skin color, texture, turgor normal.  No rashes or lesions. Neurologic:  Neurovascularly intact without any focal sensory/motor deficits. Cranial nerves: II-XII intact, grossly non-focal.  Psychiatric: Alert and oriented x4, thought content appropriate, normal insight  Capillary Refill: Brisk,< 3 seconds   Peripheral Pulses: +2 palpable, equal bilaterally     Labs:   Recent Labs     12/09/21  1202   WBC 8.2   HGB 14.8   HCT 44.8        Recent Labs     12/09/21  1202      K 3.9      CO2 20*   BUN 13   CREATININE 0.9   CALCIUM 8.9     No results for input(s): AST, ALT, BILIDIR, BILITOT, ALKPHOS in the last 72 hours. No results for input(s): INR in the last 72 hours. No results for input(s): Katkeyurrn Belch in the last 72 hours. Microbiology:    Blood culture #1: No results found for: BC    Blood culture #2:No results found for: Candida Shah    Organism:No results found for: ORG    No results found for: LABGRAM    MRSA culture only:No results found for: [facility]    Urine culture: No results found for: LABURIN    Respiratory culture: No results found for: CULTRESP    Aerobic and Anaerobic :  No results found for: LABAERO  No results found for: LABANAE    Urinalysis:    No results found for: Cathlene Shank, BACTERIA, RBCUA, BLOODU, SPECGRAV, GLUCOSEU    Radiology:  XR CHEST (2 VW)   Final Result   Multifocal interstitial and alveolar opacities consistent with pneumonia the appropriate clinical setting. Follow-up to ensure resolution is advised. **This report has been created using voice recognition software. It may contain minor errors which are inherent in voice recognition technology. **      Final report electronically signed by Dr Иван Maharaj on 12/9/2021 11:09 AM        XR CHEST (2 VW)    Result Date: 12/9/2021  PROCEDURE: XR CHEST (2 VW) CLINICAL INFORMATION: Worsening shortness of breath. Covid positive. COMPARISON: No prior study. TECHNIQUE: PA and lateral views of the chest performed. FINDINGS: Lines/tubes: None. Heart/mediastinum: The heart size is normal. The pulmonary vascularity is unremarkable. Lungs: Multifocal interstitial and alveolar opacities are most pronounced in the mid and lower lung zones. Low lung volumes are present. No pleural effusion or pneumothorax is identified. Bones: The visualized skeletal structures appear intact. Multifocal interstitial and alveolar opacities consistent with pneumonia the appropriate clinical setting. Follow-up to ensure resolution is advised. **This report has been created using voice recognition software. It may contain minor errors which are inherent in voice recognition technology. ** Final report electronically signed by Dr Malika Velazquez on 12/9/2021 11:09 AM      Electronically signed by Tessie Eli PA-C on 12/10/2021 at 9:13 AM

## 2021-12-10 NOTE — ED NOTES
ED nurse-to-nurse bedside report    Chief Complaint   Patient presents with    Positive For Covid-19     shortness of breath      LOC: alert and oriented to name, place and time  Vital signs   Vitals:    12/10/21 1045 12/10/21 1145 12/10/21 1245 12/10/21 1419   BP: 124/81 122/79 126/81 133/87   Pulse: 86 89 88 86   Resp:  24 20 (!) 32   Temp:    98.5 °F (36.9 °C)   TempSrc:       SpO2: 96% 98% 97% 95%   Weight:          Pain: 0  Oxygen: Yes    Current needs required 4L NC   Telemetry: Yes  LDAs:   Peripheral IV 12/09/21 Right Antecubital (Active)   Site Assessment Clean; Dry; Intact 12/10/21 0810   Line Status Normal saline locked 12/10/21 0810   Dressing Status Clean; Dry; Intact 12/10/21 0810     Continuous Infusions:    sodium chloride      sodium chloride 75 mL/hr at 12/10/21 1137     Mobility: Independent       Yovani Coffey RN  12/10/21 8113

## 2021-12-10 NOTE — ED NOTES
ED to inpatient nurses report    Chief Complaint   Patient presents with    Positive For Covid-19     shortness of breath      Present to ED from home  LOC: alert and orientated to name, place, date  Vital signs   Vitals:    12/09/21 1545 12/09/21 1644 12/09/21 1800 12/09/21 2000   BP: 95/76 108/68 120/74 114/75   Pulse: 87 84 88 74   Resp: 28 (!) 32 19 28   Temp:       TempSrc:       SpO2: 96% 96% 96% 97%   Weight:          Oxygen Baseline RA    Current needs required 2L NC  LDAs:   Peripheral IV 12/09/21 Right Antecubital (Active)   Site Assessment Clean; Dry; Intact 12/09/21 1212   Dressing Status Clean; Dry; Intact 12/09/21 1212     Mobility: Independent  Pending ED orders: none  Present condition: stable    Electronically signed by Dora Glasgow, RN on 12/9/2021 at 8:38 PM     Dora Glasgow RN  12/09/21 2041       Dora Glasgow RN  12/10/21 0040

## 2021-12-10 NOTE — CARE COORDINATION
12/10/21, 6:32 PM EST  DISCHARGE PLANNING EVALUATION:    Charity Cotter       Admitted: 12/9/2021/ [id number] day: 1   Location: 6A-18/018-A Reason for admit: Hypoxia [R09.02]  Pneumonia due to COVID-19 virus [U07.1, J12.82]  COVID-19 [U07.1]   PMH:  has no past medical history on file. Procedure:   12/9 CXR:   Multifocal interstitial and alveolar opacities consistent with pneumonia the appropriate clinical setting. Follow-up to ensure resolution is advised.         Barriers to Discharge:  Presented to ED with sob. + covid. Hospitalist following. Requiring 4L NC. IVF. Inhaler. Vit C, D, zinc. IV zithromax. Baricitinib. IV rocephin. Decadron. Acapella. Incentive spirometer. PCP: Kaley Corona MD  Readmission Risk Score: 4.5 ( )%    Patient Goals/Plan/Treatment Preferences: Spoke with Michaela Nadeem, he plans to return home with his wife. He does not anticipate home needs. If home O2 is needed he prefers [facility] as [facility]. Follow for further needs. Transportation/Food Security/Housekeeping Addressed:  No issues identified.

## 2021-12-11 LAB — SARS-COV-2, NAAT: DETECTED

## 2021-12-11 PROCEDURE — 6370000000 HC RX 637 (ALT 250 FOR IP): Performed by: PHYSICIAN ASSISTANT

## 2021-12-11 PROCEDURE — 6360000002 HC RX W HCPCS: Performed by: PHYSICIAN ASSISTANT

## 2021-12-11 PROCEDURE — 94669 MECHANICAL CHEST WALL OSCILL: CPT

## 2021-12-11 PROCEDURE — 1200000003 HC TELEMETRY R&B

## 2021-12-11 PROCEDURE — 87635 SARS-COV-2 COVID-19 AMP PRB: CPT

## 2021-12-11 PROCEDURE — 2580000003 HC RX 258: Performed by: PHYSICIAN ASSISTANT

## 2021-12-11 PROCEDURE — 99233 SBSQ HOSP IP/OBS HIGH 50: CPT | Performed by: PHYSICIAN ASSISTANT

## 2021-12-11 RX ORDER — 0.9 % SODIUM CHLORIDE 0.9 %
500 INTRAVENOUS SOLUTION INTRAVENOUS ONCE
Status: DISCONTINUED | OUTPATIENT
Start: 2021-12-11 | End: 2021-12-12 | Stop reason: HOSPADM

## 2021-12-11 RX ADMIN — GUAIFENESIN 600 MG: 600 TABLET, EXTENDED RELEASE ORAL at 09:19

## 2021-12-11 RX ADMIN — Medication 50 MG: at 09:19

## 2021-12-11 RX ADMIN — DEXAMETHASONE 6 MG: 4 TABLET ORAL at 09:19

## 2021-12-11 RX ADMIN — GUAIFENESIN 600 MG: 600 TABLET, EXTENDED RELEASE ORAL at 20:28

## 2021-12-11 RX ADMIN — AZITHROMYCIN DIHYDRATE 500 MG: 500 INJECTION, POWDER, LYOPHILIZED, FOR SOLUTION INTRAVENOUS at 09:22

## 2021-12-11 RX ADMIN — ENOXAPARIN SODIUM 30 MG: 100 INJECTION SUBCUTANEOUS at 22:37

## 2021-12-11 RX ADMIN — ENOXAPARIN SODIUM 30 MG: 100 INJECTION SUBCUTANEOUS at 11:39

## 2021-12-11 RX ADMIN — BARICITINIB 4 MG: 2 TABLET, FILM COATED ORAL at 09:19

## 2021-12-11 RX ADMIN — OXYCODONE HYDROCHLORIDE AND ACETAMINOPHEN 500 MG: 500 TABLET ORAL at 09:19

## 2021-12-11 RX ADMIN — CEFTRIAXONE SODIUM 1000 MG: 1 INJECTION, POWDER, FOR SOLUTION INTRAMUSCULAR; INTRAVENOUS at 11:37

## 2021-12-11 RX ADMIN — SODIUM CHLORIDE, PRESERVATIVE FREE 10 ML: 5 INJECTION INTRAVENOUS at 20:28

## 2021-12-11 RX ADMIN — SODIUM CHLORIDE: 9 INJECTION, SOLUTION INTRAVENOUS at 04:17

## 2021-12-11 RX ADMIN — Medication 2000 UNITS: at 09:19

## 2021-12-11 NOTE — PROGRESS NOTES
Physician Progress Note      Barrera Shaffer  CSN #:                  [id number]  :                       1980  ADMIT DATE:       2021 10:32 AM  DISCH DATE:  RESPONDING  PROVIDER #:        Jackie Obrien PA-C          QUERY TEXT:    Patient admitted with COVID. Documentation reflects Pneumonia due to COVID-19   virus in ED note(s) dated . If possible, please document in the progress   notes and discharge summary if Pneumonia due to COVID-19 virus was: The medical record reflects the following:  Risk Factors: COVID  Clinical Indicators: CXR: Multifocal interstitial and alveolar opacities   consistent with pneumonia. Treatment: Concern for superimposed bacterial infection, start   Zithromax/Rocephin. Thank you. Please call if you have any questions. P) [phone]. Signed   by Richard Lin RN Clinical , CRCR  Options provided:  -- COVID-19 pneumonia confirmed after study and present on admission  -- COVID-19 pneumonia treated and resolved  -- COVID-19 pneumonia ruled out after study  -- Other - I will add my own diagnosis  -- Disagree - Not applicable / Not valid  -- Disagree - Clinically unable to determine / Unknown  -- Refer to Clinical Documentation Reviewer    PROVIDER RESPONSE TEXT:    COVID-19 pneumonia confirmed after study and present on admission. Query created by: Ahmet Lugo on 2021 7:01 AM      QUERY TEXT:    Patient admitted with COVID. Noted documentation of acute respiratory failure   in H/P on . In order to support the diagnosis of acute respiratory   failure, please include additional clinical indicators in your documentation. Or please document if the diagnosis of acute respiratory failure has been   ruled out after further study. The medical record reflects the following:  Risk Factors: Covid  Clinical Indicators: resp 21-32/min.   93-98 % on RA put on 2-4 L oxygen sats   92-97 % (urgent care and was hypoxic to 88% but not in ED or Unit)  Treatment: Oxygen 2L, Incentive spirometry. Acapella. Thank you. Please call if you have any questions. P) [phone]. Signed   by Rigo Valles RN Clinical , CRCR    Acute Respiratory Failure Clinical Indicators per  MS-DRG Training Guide and   Quick Reference Guide:  pO2 < 60 mmHg or SpO2 (pulse oximetry) < 91% breathing room air  pCO2 > 50 and pH < 7.35  P/F ratio (pO2 / FIO2) < 300  pO2 decrease or pCO2 increase by 10 mmHg from baseline (if known)  Supplemental oxygen of 40% or more  Presence of respiratory distress, tachypnea, dyspnea, shortness of breath,   wheezing  Unable to speak in complete sentences  Use of accessory muscles to breathe  Extreme anxiety and feeling of impending doom  Tripod position  Confusion/altered mental status/obtunded  Options provided:  -- Acute Respiratory Failure as evidenced by, Please document evidence.   -- Acute Respiratory Failure ruled out after study  -- Acute Respiratory Failure ruled out after study and Chronic Respiratory   Failure confirmed  -- Other - I will add my own diagnosis  -- Disagree - Not applicable / Not valid  -- Disagree - Clinically unable to determine / Unknown  -- Refer to Clinical Documentation Reviewer    PROVIDER RESPONSE TEXT:    This patient is in acute respiratory failure as evidenced by Chart review   which states that pt had ambulatory pulse ox with 88% reading    Query created by: Abby Batres on 12/11/2021 7:02 AM      Electronically signed by:  Cristino Castellon PA-C 12/11/2021 7:46 AM

## 2021-12-11 NOTE — PROGRESS NOTES
Hospitalist Progress Note      Patient:  Radha Sender    Unit/Bed:6A-18/018-A  YOB: 1980  MRN: [id number]   Acct: [de-identified]   PCP: Roxanna Arceo MD  Date of Admission: 12/9/2021    Assessment/Plan:    1. COVID 19 infection: Patient tested positive for COVID-19 on 11/30 with a home test.  Pt met inpatient criteria. CRP 4.79. Started patient on Decadron 6 mg, continue daily. Pharmacy consulted to help with dosages of these medications. Vitamins & supplements started. Concern for superimposed bacterial infection, start Zithromax/Rocephin. Blood cx x2 NGTD  Supportive care. 2. Acute hypoxic respiratory failure, imrpoving: Patient's baseline is room air. Patient is currently requiring 4 L of oxygen but with SpO2 97%. Continue to wean as tolerated. Incentive spirometry. Acapella. CXR shows multifocal interstitial and alveolar opacities consistent with pneumonia. 3. Non AG metabolic acidosis: Likely secondary to GI losses, repeat BMP and monitor  4. History of tobacco use: continue to encourage abstention    Chief Complaint: SOB    Initial H and P:-    Pt is a 37 male with no significant PMH on file that presents to [facility] ED with chief complaint of SOB that began on Wednesday. History is obtained via patient.     Pt was diagnosed with COVID-19 on home test on 11/30. At that time patient was having subjective fevers and chills, body aches, headaches, sore throat, congestion and general malaise. Patient was in isolation at home, no known sick contacts. However beginning Wednesday he began to feel acutely short of breath and was seen at the urgent care where they told him he needed to go to the ED. Pt reports that when he gets up to ambulate his SPO2 drops into the 80s. Patient denies any chest pain. He does admit to a cough productive of yellowish-green sputum.   Denies any abdominal pain, however does report decreased appetite, reduced oral intake, and diarrhea. No abdominal pain or nausea / vomiting.     Patient admitted to [facility] unit. 12/10: Patient doing okay, lying in bed with wife at bedside. Patient reports continued cough with productive sputum. Continues to have shortness of breath, however saturating appropriately on 2 L. Subjective (past 24 hours):   12/11: pt doing better today, appears improved and in better spirits. Reports breathing has improved along with his cough. Diarrhea has resolved. Has been eating and drinking much better. Continue to wean O2, possible home o2 eval tomorrow. No CP. No nausea/emesis. Past medical history, family history, social history and allergies reviewed again and is unchanged since admission. ROS (All review of systems completed. Pertinent positives noted. Otherwise All other systems reviewed and negative.)     Medications:  Reviewed    Infusion Medications    sodium chloride      sodium chloride 75 mL/hr at 12/11/21 0417     Scheduled Medications    sodium chloride flush  5-40 mL IntraVENous 2 times per day    enoxaparin  30 mg SubCUTAneous Q12H    dexamethasone  6 mg Oral Daily    Vitamin D  2,000 Units Oral Daily    ascorbic acid  500 mg Oral Daily    azithromycin  500 mg IntraVENous Q24H    cefTRIAXone (ROCEPHIN) IV  1,000 mg IntraVENous Q24H    guaiFENesin  600 mg Oral BID    zinc sulfate  50 mg Oral Daily    baricitinib  4 mg Oral Daily     PRN Meds: sodium chloride flush, sodium chloride, ondansetron **OR** ondansetron, polyethylene glycol, acetaminophen **OR** acetaminophen, albuterol sulfate HFA      Intake/Output Summary (Last 24 hours) at 12/11/2021 0767  Last data filed at 12/10/2021 1140  Gross per 24 hour   Intake 10 ml   Output --   Net 10 ml       Diet:  ADULT DIET;  Regular    Exam:  /85   Pulse 72   Temp 97.3 °F (36.3 °C) (Axillary)   Resp 26   Wt 200 lb (90.7 kg)   SpO2 95%   General appearance: pleasant, slightly diaphoretic, no apparent distress, appears stated age and cooperative. HEENT: Normal cephalic, atraumatic without obvious deformity. Pupils equal, round, and reactive to light. Extra ocular muscles intact. Conjunctivae/corneas clear. Neck: Supple, with full range of motion. No jugular venous distention. Trachea midline. Respiratory: Increased respiratory effort. Severely diminished to auscultation especially in bases, bilaterally without Rales/Wheezes/Rhonchi. Cardiovascular: tachycardic rate and rhythm with normal S1/S2 without murmurs, rubs or gallops. Abdomen: Soft, non-tender, non-distended with normal bowel sounds. Musculoskeletal:  No clubbing, cyanosis or edema bilaterally. Skin: Skin color, texture, turgor normal.  No rashes or lesions. Neurologic:  Neurovascularly intact without any focal sensory/motor deficits. Cranial nerves: II-XII intact, grossly non-focal.  Psychiatric: Alert and oriented x4, thought content appropriate, normal insight  Capillary Refill: Brisk,< 3 seconds   Peripheral Pulses: +2 palpable, equal bilaterally     Labs:   Recent Labs     12/09/21  1202 12/10/21  0923   WBC 8.2 10.5   HGB 14.8 14.7   HCT 44.8 43.6    375     Recent Labs     12/09/21  1202 12/10/21  0923    141   K 3.9 4.0    105   CO2 20* 22*   BUN 13 15   CREATININE 0.9 0.7   CALCIUM 8.9 9.0     No results for input(s): AST, ALT, BILIDIR, BILITOT, ALKPHOS in the last 72 hours. No results for input(s): INR in the last 72 hours. No results for input(s): Gisel Horn in the last 72 hours.     Microbiology:    Blood culture #1:   Lab Results   Component Value Date    BC No growth-preliminary  12/10/2021       Blood culture #2:No results found for: Aron Gresham    Organism:No results found for: ORG    No results found for: LABGRAM    MRSA culture only:No results found for: [facility]    Urine culture: No results found for: LABURIN    Respiratory culture: No results found for: CULTRESP    Aerobic and Anaerobic :  No results found for: LABAERO  No results found for: LABANAE    Urinalysis:    No results found for: Burris Elver, BACTERIA, RBCUA, BLOODU, SPECGRAV, GLUCOSEU    Radiology:  XR CHEST (2 VW)   Final Result   Multifocal interstitial and alveolar opacities consistent with pneumonia the appropriate clinical setting. Follow-up to ensure resolution is advised. **This report has been created using voice recognition software. It may contain minor errors which are inherent in voice recognition technology. **      Final report electronically signed by Dr Leonor Machuca on 12/9/2021 11:09 AM        XR CHEST (2 VW)    Result Date: 12/9/2021  PROCEDURE: XR CHEST (2 VW) CLINICAL INFORMATION: Worsening shortness of breath. Covid positive. COMPARISON: No prior study. TECHNIQUE: PA and lateral views of the chest performed. FINDINGS: Lines/tubes: None. Heart/mediastinum: The heart size is normal. The pulmonary vascularity is unremarkable. Lungs: Multifocal interstitial and alveolar opacities are most pronounced in the mid and lower lung zones. Low lung volumes are present. No pleural effusion or pneumothorax is identified. Bones: The visualized skeletal structures appear intact. Multifocal interstitial and alveolar opacities consistent with pneumonia the appropriate clinical setting. Follow-up to ensure resolution is advised. **This report has been created using voice recognition software. It may contain minor errors which are inherent in voice recognition technology. ** Final report electronically signed by Dr Leonor Machuca on 12/9/2021 11:09 AM      Electronically signed by Luigi Russell PA-C on 12/11/2021 at 7:45 AM

## 2021-12-12 VITALS
OXYGEN SATURATION: 98 % | SYSTOLIC BLOOD PRESSURE: 112 MMHG | WEIGHT: 200 LBS | TEMPERATURE: 98 F | HEART RATE: 90 BPM | DIASTOLIC BLOOD PRESSURE: 83 MMHG | RESPIRATION RATE: 20 BRPM

## 2021-12-12 LAB
ANION GAP SERPL CALCULATED.3IONS-SCNC: 12 MEQ/L (ref 8–16)
BUN BLDV-MCNC: 14 MG/DL (ref 7–22)
CALCIUM SERPL-MCNC: 9.3 MG/DL (ref 8.5–10.5)
CHLORIDE BLD-SCNC: 104 MEQ/L (ref 98–111)
CO2: 25 MEQ/L (ref 23–33)
CREAT SERPL-MCNC: 0.7 MG/DL (ref 0.4–1.2)
GFR SERPL CREATININE-BSD FRML MDRD: > 90 ML/MIN/1.73M2
GLUCOSE BLD-MCNC: 120 MG/DL (ref 70–108)
POTASSIUM SERPL-SCNC: 4.3 MEQ/L (ref 3.5–5.2)
SODIUM BLD-SCNC: 141 MEQ/L (ref 135–145)

## 2021-12-12 PROCEDURE — 99239 HOSP IP/OBS DSCHRG MGMT >30: CPT | Performed by: PHYSICIAN ASSISTANT

## 2021-12-12 PROCEDURE — 80048 BASIC METABOLIC PNL TOTAL CA: CPT

## 2021-12-12 PROCEDURE — 36415 COLL VENOUS BLD VENIPUNCTURE: CPT

## 2021-12-12 PROCEDURE — 6370000000 HC RX 637 (ALT 250 FOR IP): Performed by: PHYSICIAN ASSISTANT

## 2021-12-12 PROCEDURE — 2580000003 HC RX 258: Performed by: PHYSICIAN ASSISTANT

## 2021-12-12 PROCEDURE — 6360000002 HC RX W HCPCS: Performed by: PHYSICIAN ASSISTANT

## 2021-12-12 RX ORDER — ALBUTEROL SULFATE 90 UG/1
2 AEROSOL, METERED RESPIRATORY (INHALATION) EVERY 4 HOURS PRN
Qty: 18 G | Refills: 3 | Status: SHIPPED | OUTPATIENT
Start: 2021-12-12

## 2021-12-12 RX ORDER — GUAIFENESIN 600 MG/1
600 TABLET, EXTENDED RELEASE ORAL 2 TIMES DAILY
Qty: 14 TABLET | Refills: 0 | Status: SHIPPED | OUTPATIENT
Start: 2021-12-12

## 2021-12-12 RX ORDER — CHOLECALCIFEROL (VITAMIN D3) 50 MCG
2000 TABLET ORAL DAILY
Qty: 60 TABLET | Refills: 0 | Status: SHIPPED | OUTPATIENT
Start: 2021-12-13

## 2021-12-12 RX ORDER — 0.9 % SODIUM CHLORIDE 0.9 %
250 INTRAVENOUS SOLUTION INTRAVENOUS ONCE
Status: COMPLETED | OUTPATIENT
Start: 2021-12-12 | End: 2021-12-12

## 2021-12-12 RX ORDER — ASCORBIC ACID 250 MG
500 TABLET ORAL DAILY
Qty: 30 TABLET | Refills: 3 | Status: SHIPPED | OUTPATIENT
Start: 2021-12-12

## 2021-12-12 RX ORDER — ZINC SULFATE 50(220)MG
50 CAPSULE ORAL DAILY
Qty: 30 CAPSULE | Refills: 3 | COMMUNITY
Start: 2021-12-13

## 2021-12-12 RX ORDER — DEXAMETHASONE 6 MG/1
6 TABLET ORAL DAILY
Qty: 6 TABLET | Refills: 0 | Status: CANCELLED | OUTPATIENT
Start: 2021-12-13 | End: 2021-12-19

## 2021-12-12 RX ADMIN — DEXAMETHASONE 6 MG: 4 TABLET ORAL at 09:25

## 2021-12-12 RX ADMIN — AZITHROMYCIN DIHYDRATE 500 MG: 500 INJECTION, POWDER, LYOPHILIZED, FOR SOLUTION INTRAVENOUS at 10:10

## 2021-12-12 RX ADMIN — ENOXAPARIN SODIUM 30 MG: 100 INJECTION SUBCUTANEOUS at 11:39

## 2021-12-12 RX ADMIN — Medication 50 MG: at 09:26

## 2021-12-12 RX ADMIN — OXYCODONE HYDROCHLORIDE AND ACETAMINOPHEN 500 MG: 500 TABLET ORAL at 09:26

## 2021-12-12 RX ADMIN — Medication 2000 UNITS: at 09:26

## 2021-12-12 RX ADMIN — SODIUM CHLORIDE, PRESERVATIVE FREE 10 ML: 5 INJECTION INTRAVENOUS at 09:26

## 2021-12-12 RX ADMIN — BARICITINIB 4 MG: 2 TABLET, FILM COATED ORAL at 09:26

## 2021-12-12 RX ADMIN — SODIUM CHLORIDE 250 ML: 9 INJECTION, SOLUTION INTRAVENOUS at 12:46

## 2021-12-12 RX ADMIN — GUAIFENESIN 600 MG: 600 TABLET, EXTENDED RELEASE ORAL at 09:25

## 2021-12-12 RX ADMIN — CEFTRIAXONE SODIUM 1000 MG: 1 INJECTION, POWDER, FOR SOLUTION INTRAMUSCULAR; INTRAVENOUS at 11:38

## 2021-12-12 NOTE — PROGRESS NOTES
A home oxygen evaluation has been completed. [x]Patient is an inpatient. It is expected that the patient will be discharged within the next 48 hours. Qualified provider to write order for home prescription if patient qualifies. Social service/care managers will arrange for home oxygen. If patient is active, arrange for Home Medical supplier to assess for Oxygen Conserving Device per pulse oximetry. []Patient is an outpatient. Results will be faxed to the ordering provider. Qualified provider to write order for home prescription if patient qualifies and arranges for home oxygen. Patient was placed on room air for since 0327 on 12/12/21. SpO2 was 97 % on room air at rest. Patients SpO2 was 89% or above and did not qualify for home oxygen. Patient was walked for 6 minutes. SpO2 was 95-96 % during walking. Patient did not qualify for home O2.

## 2021-12-12 NOTE — PROGRESS NOTES
Patient into wheelchair, condition stable, discharged from hospital to car with belongings and wife at side.

## 2021-12-12 NOTE — PROGRESS NOTES
Updated patient's wife, Radhames Espinoza, regarding room air, no shortness of breath, doing home oxygen evaluation currently with respiratory, gave fluid bolus last night for bradycardia, and then heart rate increased this morning, and another fluid bolus given, possible discharge today but no orders yet. Radhames Espinoza verbalized understanding.

## 2021-12-12 NOTE — DISCHARGE SUMMARY
Hospitalist Discharge Summary        Patient: Shelly Laura  YOB: 1980  MRN: [id number]   Acct: [de-identified]    Primary Care Physician: Savanna Saunders MD    Admit date  12/9/2021    Discharge date: 12/12/2021  5:19 PM    Chief Complaint on presentation :-  SOB    Discharge Assessment and Plan:-   1. COVID 19 infection: Patient tested positive for COVID-19 on 11/30 with a home test.  Pt met inpatient criteria. CRP 4.79.  Started patient on Decadron 6 mg, continue daily. Pharmacy consulted to help with dosages of these medications. Vitamins & supplements started.  Concern for superimposed bacterial infection, start Zithromax/Rocephin. Blood cx x2 NGTD  Supportive care. 2. Acute hypoxic respiratory failure, resolved: Patient's baseline is room air.  Patient is currently requiring 4 L of oxygen but with SpO2 97%. Continue to wean as tolerated. Incentive spirometry.  Acapella. CXR shows multifocal interstitial and alveolar opacities consistent with pneumonia. pt has been weaned to RA. No home O2 required per eval.   3. Asymptomatic bradycardia: noted overnight while pt was asleep. Pt reports that when he was awoken he did not feel lightheaded/dizzy or SOB. F/u with PCP. 4. Non AG metabolic acidosis: Likely secondary to GI losses, repeat BMP and monitor. Resolved   5. History of tobacco use: continue to encourage abstention       Initial H and P and Hospital course:-  Pt is a 37 male with no significant PMH on file that presents to [facility] ED with chief complaint of SOB that began on Wednesday.  History is obtained via patient.     Pt was diagnosed with COVID-19 on home test on 11/30. Asim Linear that time patient was having subjective fevers and chills, body aches, headaches, sore throat, congestion and general malaise.  Patient was in isolation at home, no known sick contacts.  However beginning Wednesday he began to feel acutely short of breath and was seen at the urgent care where they told him he needed to go to the ED.  Pt reports that when he gets up to ambulate his SPO2 drops into the 80s.  Patient denies any chest pain.  He does admit to a cough productive of yellowish-green sputum.  Denies any abdominal pain, however does report decreased appetite, reduced oral intake, and diarrhea.  No abdominal pain or nausea / vomiting.     Patient admitted to Covid unit.     12/10: Patient doing okay, lying in bed with wife at bedside. Patient reports continued cough with productive sputum. Continues to have shortness of breath, however saturating appropriately on 2 L.    12/11: pt doing better today, appears improved and in better spirits. Reports breathing has improved along with his cough. Diarrhea has resolved. Has been eating and drinking much better. Continue to wean O2, possible home o2 eval tomorrow. No CP. No nausea/emesis. Pt was admitted 12/09 secondary to SOB. Pt had tested positive for COVID using an at home testing kit. Pt quarantined at home however became hypoxic and so sought evaluation at the ED. Pt was admitted and treated with Decadron, Baricitinib and supportive care. Abx were implemented, however, there was low suspicion for infectious etiology, therefore these were not continued at discharge. Over the course of his admission, pt's symptoms improved and hypoxia resolved. Pt had home O2 eval which showed he did not require supplemental O2 at discharge. Therefore, no steroids were prescribed and pt was instructed to continue taking his vitamin supplements. Furthermore, pt was noted to bradycardic at times through the night. Pt denied any symptoms with this, including dizziness/SOB. He will need to f/u with is PCP closely after discharge. All VSS.           Physical Exam:-  Vitals:   Patient Vitals for the past 24 hrs:   BP Temp Temp src Pulse Resp SpO2   12/12/21 0915 103/69 97.7 °F (36.5 °C) Oral 103 20 96 %   12/12/21 0331 -- -- -- -- -- 97 %   12/12/21 0327 130/80 98.1 °F (36.7 °C) Oral (!) 43 20 98 %   12/11/21 2320 (!) 151/85 98.4 °F (36.9 °C) Oral (!) 41 20 94 %   12/11/21 1941 (!) 145/79 98 °F (36.7 °C) Oral (!) 49 22 97 %   12/11/21 1533 135/81 98.3 °F (36.8 °C) Oral 76 21 94 %   12/11/21 1120 112/76 -- -- 104 22 99 %     Weight:   Weight: 200 lb (90.7 kg)   24 hour intake/output:     Intake/Output Summary (Last 24 hours) at 12/12/2021 0945  Last data filed at 12/11/2021 1814  Gross per 24 hour   Intake 1563.03 ml   Output --   Net 1563.03 ml       1. General appearance: No apparent distress, appears stated age and cooperative. 2. HEENT: Normal cephalic, atraumatic without obvious deformity. Pupils equal, round, and reactive to light. Extra ocular muscles intact. Conjunctivae/corneas clear. 3. Neck: Supple, with full range of motion. No jugular venous distention. Trachea midline. 4. Respiratory:  Normal respiratory effort. Clear to auscultation, bilaterally without Rales/Wheezes/Rhonchi. 5. Cardiovascular: Regular rate and rhythm with normal S1/S2 without murmurs, rubs or gallops. 6. Abdomen: Soft, non-tender, non-distended with normal bowel sounds. 7. Musculoskeletal:  No clubbing, cyanosis or edema bilaterally. 8. Skin: Skin color, texture, turgor normal.  No rashes or lesions. 9. Neurologic:  Neurovascularly intact without any focal sensory/motor deficits. Cranial nerves: II-XII intact, grossly non-focal.  10. Psychiatric: Alert and oriented, thought content appropriate, normal insight  11. Capillary Refill: Brisk,< 3 seconds   12. Peripheral Pulses: +2 palpable, equal bilaterally       Discharge Medications:-      Medication List      You have not been prescribed any medications.           Labs :-  Recent Results (from the past 72 hour(s))   CBC Auto Differential    Collection Time: 12/09/21 12:02 PM   Result Value Ref Range    WBC 8.2 4.8 - 10.8 thou/mm3    RBC 4.93 4.70 - 6.10 mill/mm3    Hemoglobin 14.8 14.0 - 18.0 gm/dl    Hematocrit 44.8 42.0 - 52.0 %    MCV 90.9 80.0 - 94.0 fL    MCH 30.0 26.0 - 33.0 pg    MCHC 33.0 32.2 - 35.5 gm/dl    RDW-CV 12.7 11.5 - 14.5 %    RDW-SD 42.1 35.0 - 45.0 fL    Platelets 980 233 - 486 thou/mm3    MPV 8.8 (L) 9.4 - 12.4 fL    Seg Neutrophils 85.3 %    Lymphocytes 8.8 %    Monocytes 5.5 %    Eosinophils 0.0 %    Basophils 0.0 %    Immature Granulocytes 0.4 %    Segs Absolute 7.0 1.8 - 7.7 thou/mm3    Lymphocytes Absolute 0.7 (L) 1.0 - 4.8 thou/mm3    Monocytes Absolute 0.5 0.4 - 1.3 thou/mm3    Eosinophils Absolute 0.0 0.0 - 0.4 thou/mm3    Basophils Absolute 0.0 0.0 - 0.1 thou/mm3    Immature Grans (Abs) 0.03 0.00 - 0.07 thou/mm3    nRBC 0 /100 wbc   Basic Metabolic Panel w/ Reflex to MG    Collection Time: 12/09/21 12:02 PM   Result Value Ref Range    Sodium 138 135 - 145 meq/L    Potassium reflex Magnesium 3.9 3.5 - 5.2 meq/L    Chloride 102 98 - 111 meq/L    CO2 20 (L) 23 - 33 meq/L    Glucose 125 (H) 70 - 108 mg/dL    BUN 13 7 - 22 mg/dL    CREATININE 0.9 0.4 - 1.2 mg/dL    Calcium 8.9 8.5 - 10.5 mg/dL   Troponin    Collection Time: 12/09/21 12:02 PM   Result Value Ref Range    Troponin T < 0.010 ng/ml   Procalcitonin    Collection Time: 12/09/21 12:02 PM   Result Value Ref Range    Procalcitonin 0.16 (H) 0.01 - 0.09 ng/mL   Ferritin    Collection Time: 12/09/21 12:02 PM   Result Value Ref Range    Ferritin 920 (H) 22 - 322 ng/mL   Lactate Dehydrogenase    Collection Time: 12/09/21 12:02 PM   Result Value Ref Range     (H) 100 - 190 U/L   Anion Gap    Collection Time: 12/09/21 12:02 PM   Result Value Ref Range    Anion Gap 16.0 8.0 - 16.0 meq/L   Glomerular Filtration Rate, Estimated    Collection Time: 12/09/21 12:02 PM   Result Value Ref Range    Est, Glom Filt Rate >90 ml/min/1.73m2   Osmolality    Collection Time: 12/09/21 12:02 PM   Result Value Ref Range    Osmolality Calc 277.3 275.0 - 300.0 mOsmol/kg   EKG 12 Lead    Collection Time: 12/09/21 12:03 PM   Result Value Ref Range    Ventricular Rate 97 BPM    Atrial Rate 97 BPM    P-R Interval 144 ms    QRS Duration 88 ms    Q-T Interval 310 ms    QTc Calculation (Bazett) 393 ms    P Axis 41 degrees    R Axis 39 degrees    T Axis 17 degrees   Basic Metabolic Panel    Collection Time: 12/10/21  9:23 AM   Result Value Ref Range    Sodium 141 135 - 145 meq/L    Potassium 4.0 3.5 - 5.2 meq/L    Chloride 105 98 - 111 meq/L    CO2 22 (L) 23 - 33 meq/L    Glucose 133 (H) 70 - 108 mg/dL    BUN 15 7 - 22 mg/dL    CREATININE 0.7 0.4 - 1.2 mg/dL    Calcium 9.0 8.5 - 10.5 mg/dL   CBC    Collection Time: 12/10/21  9:23 AM   Result Value Ref Range    WBC 10.5 4.8 - 10.8 thou/mm3    RBC 4.78 4.70 - 6.10 mill/mm3    Hemoglobin 14.7 14.0 - 18.0 gm/dl    Hematocrit 43.6 42.0 - 52.0 %    MCV 91.2 80.0 - 94.0 fL    MCH 30.8 26.0 - 33.0 pg    MCHC 33.7 32.2 - 35.5 gm/dl    RDW-CV 12.7 11.5 - 14.5 %    RDW-SD 42.5 35.0 - 45.0 fL    Platelets 434 997 - 303 thou/mm3    MPV 8.8 (L) 9.4 - 12.4 fL   C-reactive protein    Collection Time: 12/10/21  9:23 AM   Result Value Ref Range    CRP 4.79 (H) 0.00 - 1.00 mg/dl   Culture, Blood 1    Collection Time: 12/10/21  9:23 AM    Specimen: Blood   Result Value Ref Range    Blood Culture, Routine No growth-preliminary     Anion Gap    Collection Time: 12/10/21  9:23 AM   Result Value Ref Range    Anion Gap 14.0 8.0 - 16.0 meq/L   Glomerular Filtration Rate, Estimated    Collection Time: 12/10/21  9:23 AM   Result Value Ref Range    Est, Glom Filt Rate >90 ml/min/1.73m2   Osmolality    Collection Time: 12/10/21  9:23 AM   Result Value Ref Range    Osmolality Calc 284.0 275.0 - 300.0 mOsmol/kg   Culture, Blood 1    Collection Time: 12/10/21  9:31 AM    Specimen: Blood   Result Value Ref Range    Blood Culture, Routine No growth-preliminary     COVID-19, Rapid    Collection Time: 12/11/21  9:30 AM    Specimen: Nasopharyngeal Swab   Result Value Ref Range    SARS-CoV-2, NAAT DETECTED (AA) NOT DETECTED   Basic Metabolic Panel    Collection Time: 12/12/21  8:11 AM   Result Value Ref Range    Sodium 141 135 - 145 meq/L    Potassium 4.3 3.5 - 5.2 meq/L    Chloride 104 98 - 111 meq/L    CO2 25 23 - 33 meq/L    Glucose 120 (H) 70 - 108 mg/dL    BUN 14 7 - 22 mg/dL    CREATININE 0.7 0.4 - 1.2 mg/dL    Calcium 9.3 8.5 - 10.5 mg/dL   Anion Gap    Collection Time: 12/12/21  8:11 AM   Result Value Ref Range    Anion Gap 12.0 8.0 - 16.0 meq/L   Glomerular Filtration Rate, Estimated    Collection Time: 12/12/21  8:11 AM   Result Value Ref Range    Est, Glom Filt Rate >90 ml/min/1.73m2        Microbiology:    Blood culture #1:   Lab Results   Component Value Date    BC No growth-preliminary  12/10/2021       Blood culture #2:No results found for: Maye Mackay    Organism:  No results found for: LABGRAM    MRSA culture only:No results found for: [facility]    Urine culture: No results found for: LABURIN  No results found for: ORG     Respiratory culture: No results found for: CULTRESP    Aerobic and Anaerobic :  No results found for: LABAERO  No results found for: LABANAE    Urinalysis:    No results found for: Jonas Moulder, BACTERIA, RBCUA, BLOODU, SPECGRAV, [address]    Radiology:-  XR CHEST (2 VW)    Result Date: 12/9/2021  PROCEDURE: XR CHEST (2 VW) CLINICAL INFORMATION: Worsening shortness of breath. Covid positive. COMPARISON: No prior study. TECHNIQUE: PA and lateral views of the chest performed. FINDINGS: Lines/tubes: None. Heart/mediastinum: The heart size is normal. The pulmonary vascularity is unremarkable. Lungs: Multifocal interstitial and alveolar opacities are most pronounced in the mid and lower lung zones. Low lung volumes are present. No pleural effusion or pneumothorax is identified. Bones: The visualized skeletal structures appear intact. Multifocal interstitial and alveolar opacities consistent with pneumonia the appropriate clinical setting. Follow-up to ensure resolution is advised. **This report has been created using voice recognition software.   It may contain minor errors which are inherent in voice recognition technology. ** Final report electronically signed by Dr Kell Mccall on 12/9/2021 11:09 AM       Follow-up scheduled after discharge :-    in the next few days with Hung Hope MD    Consultations during this hospital stay:-  [x] NONE [] Cardiology  [] Nephrology  [] Hemo onco   [] GI   [] ID  [] Endocrine  [] Pulm    [] Neuro    [] Psych   [] Urology  [] ENT   [] G SURGERY   []Ortho    []CV surg    [] Palliative  [] Hospice [] Pain management   []    []TCU   [] PT/OT  OTHERS:-    Disposition: home  Condition at Discharge: Stable    Time Spent:- 35 minutes    Electronically signed by Art Garcia PA-C on 12/12/21 at 9:45 AM EST   Discharging Hospitalist

## 2021-12-12 NOTE — PROGRESS NOTES
Leslie TY on unit notified of patient heart rate overnight bradycardia during sleep, and with movement this am increases to 120's. Orders received.

## 2021-12-12 NOTE — PROGRESS NOTES
Notified patient wife patient is being discharged. Wife stated they use the [facility] in [place]. RN called the [facility] on [address], they will send the prescriptions to the [address] for the patient to . Notified wife and patient to  prescriptions from [address] Patient and wife verbalized understanding.

## 2021-12-12 NOTE — PROGRESS NOTES
Notified Dr. Samir Lo that patient is bradycardic, heart rate dropped down to 39 and sustained. He is asymptomatic. No new orders at this time.

## 2021-12-12 NOTE — PROGRESS NOTES
Gave patient discharge instructions, patient verbalized understanding. Patient condition stable. Removed Tele monitoring, patient gathering belongings, putting street clothes on, waiting for wife to arrive.

## 2021-12-13 ENCOUNTER — CARE COORDINATION (OUTPATIENT)
Dept: OTHER | Facility: CLINIC | Age: 41
End: 2021-12-13

## 2021-12-13 NOTE — CARE COORDINATION
[facility] Transitions Initial Follow Up Call    Call within 2 business days of discharge: Yes    Patient: Tigre Strickland Patient : 1980   MRN: [id number]  Reason for Admission: Covid+, Acute hypoxic resp failure  Discharge Date: 21 RARS: Readmission Risk Score: 3.9 ( )      Last Discharge [facility]       Complaint Diagnosis Description Type Department Provider    21 Positive For Covid-19 Pneumonia due to COVID-19 virus . .. ED to Hosp-Admission (Discharged) (ADMITTED) [facility] Michae Klinefelter, PA-C; Fidelia Boucher, ... Spoke with: both patient and his wife    Patient contacted regarding COVID-19 diagnosis. Discussed COVID-19 related testing which was available at this time. Test results were positive. Patient informed of results, if available? Yes and pt states had tested positive on 21. Care Transition Nurse contacted the patient by telephone to perform post discharge assessment. Call within 2 business days of discharge: Yes. Verified name and  with patient as identifiers. Provided introduction to self, and explanation of the CTN/ACM role, and reason for call due to risk factors for infection and/or exposure to COVID-19. Symptoms reviewed with patient and his wife who verbalized the following symptoms: fatigue, cough, shortness of breath, loss of taste or smell and no worsening symptoms. Patient states that he is feeling alright; still has a slight cough, slight shortness of breath upon exertion and is fatigued and taste is off. Pt denies any lightheadedness, tachycardia, fever, diarrhea, chest pain. Pt was not sent home with a pulse oximeter. Wife states pt was Covid positive on 21. CTN discussed quarantine: greater than 10 days since diagnosis, fever free for 24 hours and no worsening of symptoms and pt/wife verbalized understanding. CTN educated on red flag symptoms and pt/wife verbalized understanding.    Wife states they did not  prescriptions for ascorbic acid, mucinex, vitamin D, or zinc, as they already had those otc meds at home; pt is taking them as prescribed. Pt states he has not had to use his inhaler, but has it if he needs it. Pt/wife live at home with 2 children. Wife states that she has covid like symptoms, but tested negative today and children do not have symptoms. Wife states she has not scheduled pt f/u visit. ACM offered to schedule, but wife stated she will call today. Wife called ACM back and left message; ACM called wife and she states PCP will not see in the office for 30 days; ACM encouraged wife to call pcp office and get a virtual visit. CTN sent Covid resource letter and Covid Zone tool to pt via My Chart. Due to no new or worsening symptoms encounter was not routed to provider for escalation. Discussed follow-up appointments. If no appointment was previously scheduled, appointment scheduling offered: Yes and wife states she will contact pcp for appt today. [facility] follow up appointment(s): No future appointments. Non-[facility] follow up appointment(s): none known    Non-face-to-face services provided:  Obtained and reviewed discharge summary and/or continuity of care documents  Education of patient/family/caregiver/guardian to support self-management-red flag symptoms, Covid zone tool and pt/wife verbalized understanding  Assessment and support for treatment adherence and medication management-reviewed medications and pt taking. Wife states pt is taking otc of vitamin C, vitamin D and zinc and mucinex; did not  those prescriptions     Advance Care Planning:   Does patient have an Advance Directive:  not on file. Educated patient about risk for severe COVID-19 due to risk factors according to CDC guidelines. CTN reviewed discharge instructions, medical action plan and red flag symptoms with the patient and wife who verbalized understanding.  Discussed COVID vaccination status: Yes and pt has not had Services: Completed (Comment: Covid resource letter and Covid Zone tool sent via My Chart)          Follow Up  No future appointments. Rosa Huff MSN, RN   Ambulatory Care Manager  Associate Care Management  Cell [phone]  [email or web address]

## 2021-12-15 LAB
BLOOD CULTURE, ROUTINE: NORMAL
BLOOD CULTURE, ROUTINE: NORMAL

## 2021-12-20 ENCOUNTER — CARE COORDINATION (OUTPATIENT)
Dept: OTHER | Facility: CLINIC | Age: 41
End: 2021-12-20

## 2021-12-27 ENCOUNTER — CARE COORDINATION (OUTPATIENT)
Dept: OTHER | Facility: CLINIC | Age: 41
End: 2021-12-27

## 2021-12-27 NOTE — CARE COORDINATION
[facility] Transitions Follow Up Call    2021    Patient: Dheeraj Ho  Patient : 1980   MRN: [id number]  Reason for Admission: Pneumonia due to Covid 19 virus  Discharge Date: 21 RARS: Readmission Risk Score: 3.9 ( )         Spoke with: Dheeraj Ho, patient    ACM attempted to reach patient for follow up call regarding admission for Covid 19. Patient reports currently having [place], has this ACM's contact information and will return call at his convenience. Will continue to follow. Follow Up  No future appointments.     Yaron Wilson RN

## 2022-01-04 ENCOUNTER — CARE COORDINATION (OUTPATIENT)
Dept: OTHER | Facility: CLINIC | Age: 42
End: 2022-01-04

## 2022-01-04 NOTE — CARE COORDINATION
[facility] Transitions Follow Up Call    2022    Patient: Jeancarlos Rose  Patient : 1980   MRN: [id number]  Reason for Admission: Covid+, Acute hypoxic resp failure  Discharge Date: 21 RARS: Readmission Risk Score: 3.9 ( )         Spoke with: Noone    [facility] attempted to reach patient for follow up call regarding Covid 19 monitoring. HIPAA compliant message left requesting a return phone call at patients convenience. Unable to reach letter sent via My Chart. Will continue to follow. Follow Up  No future appointments. Ken OLIVER, RN   Ambulatory Care Manager  Associate Care Management  Cell [phone]  [email or web address]

## 2022-01-11 ENCOUNTER — CARE COORDINATION (OUTPATIENT)
Dept: OTHER | Facility: CLINIC | Age: 42
End: 2022-01-11

## 2022-01-11 NOTE — CARE COORDINATION
[facility] Transitions Follow Up Call    2022    Patient: Jeancarlos Rose  Patient : 1980   MRN: [id number]  Reason for Admission: Covid+, Acute hypoxic resp failure  Discharge Date: 21 RARS: Readmission Risk Score: 3.9 ( )    Ambulatory Care Manager contacted the patient by telephone to perform follow-up assessment. Verified name and  with patient as identifiers. Patient has following risk factors of: no known risk factors. Patient resolved from the Care Transitions episode on 22  Discussed COVID-19 related testing which was available at this time. Test results were positive. Patient informed of results, if available? Yes    Patient/family has been provided the following resources and education related to COVID-19:                         Signs, symptoms and red flags related to COVID-19            CDC exposure and quarantine guidelines            [employer] exposure contact - [phone]            Contact for their local Department of Health                 Patient currently reports that the following symptoms have improved:  cough, shortness of breath and no new/worsening symptoms     Pt states that he is fine now; denies any current covid symptoms. Pt reports he is eating and drinking normally now. Pt reports that he did not follow up with PCP, as pcp did not want to see him in office for 30 days post covid. ACM encouraged pt to make f/u appt with pcp, since he was inpatient with covid and had respiratory failure. No further outreach scheduled with this CTN/ACM. Episode of Care resolved. Patient has this CTN/ACM contact information if future needs arise.     Care Transitions Subsequent and Final Call    Schedule Follow Up Appointment with PCP: Declined  Subsequent and Final Calls  Care Transitions Interventions  No Identified Needs     Other Services: Completed (Comment: Covid resource letter and Covid Zone tool sent via My Chart)   Other Interventions:         Goals  Conditions and Symptoms      I will schedule office visits, as directed by my provider. I will keep my appointment or reschedule if I have to cancel. I will notify my provider of any barriers to my plan of care. I will follow my Zone Management tool to seek urgent or emergent care. I will notify my provider of any symptoms that indicate a worsening of my condition. Barriers: lack of education  Plan for overcoming my barriers: Work with ACM and wife is a nurse  Confidence: 8/10  Anticipated Goal Completion Date: 1/13/22 12/13/21 - Spoke with both wife and pt. Have not yet made f/u appt. ACM offered to make appt; wife declined, stating she will call and make appt today    1/11/22 - Pt did not make f/u visit with pcp,stating pcp did not want to see him in office for 30 days post covid; encouraged again use of virtual visits with provider. Follow Up  No future appointments. Nasim Lehman MSN, RN   Ambulatory Care Manager  Associate Care Management  Cell [phone]  [email or web address]

## 2022-01-11 NOTE — CARE COORDINATION
[facility] Transitions Follow Up Call    2022    Patient: Stephanie Tian  Patient : 1980   MRN: [id number]  Reason for Admission: Covid+, Acute hypoxic resp failure  Discharge Date: 21 RARS: Readmission Risk Score: 3.9 ( )         Spoke with: noone    ACM attempted to reach patient for final follow up call regarding Covid 19 monitoring. HIPAA compliant message left requesting a return phone call at patients convenience. Final Unable to Reach Letter sent via My Chart. No further outreach scheduled with this ACM, ACM will sign off care team at this time. Patient has been provided with this ACM's contact information. Follow Up  No future appointments. Jovan Hernandez MSN, RN   Ambulatory Care Manager  Associate Care Management  Cell [phone]  [email or web address]

## 2022-05-10 LAB
CHOLESTEROL, TOTAL: 243 MG/DL (ref 0–199)
FASTING: YES
GLUCOSE BLD-MCNC: 101 MG/DL (ref 74–109)
HDLC SERPL-MCNC: 48 MG/DL (ref 40–90)
LDL CHOLESTEROL CALCULATED: 161 MG/DL
TRIGL SERPL-MCNC: 170 MG/DL (ref 0–199)

## 2022-12-01 ENCOUNTER — NURSE ONLY (OUTPATIENT)
Dept: LAB | Age: 42
End: 2022-12-01

## 2022-12-01 LAB
ALT SERPL-CCNC: 28 U/L (ref 11–66)
AST SERPL-CCNC: 34 U/L (ref 5–40)
CHOLESTEROL, TOTAL: 184 MG/DL (ref 100–199)
HDLC SERPL-MCNC: 48 MG/DL
LDL CHOLESTEROL CALCULATED: 122 MG/DL
TRIGL SERPL-MCNC: 70 MG/DL (ref 0–199)

## 2023-06-06 ENCOUNTER — HOSPITAL ENCOUNTER (OUTPATIENT)
Age: 43
Discharge: HOME | End: 2023-06-06
Payer: COMMERCIAL

## 2023-06-06 ENCOUNTER — HOSPITAL ENCOUNTER (OUTPATIENT)
Dept: GENERAL RADIOLOGY | Age: 43
Discharge: HOME | End: 2023-06-06
Payer: COMMERCIAL

## 2023-06-06 DIAGNOSIS — M25.561 RIGHT KNEE PAIN, UNSPECIFIED CHRONICITY: ICD-10-CM

## 2023-06-06 PROCEDURE — 73560 X-RAY EXAM OF KNEE 1 OR 2: CPT

## 2023-07-03 ENCOUNTER — HOSPITAL ENCOUNTER (OUTPATIENT)
Dept: MRI IMAGING | Age: 43
Discharge: HOME | End: 2023-07-03
Attending: FAMILY MEDICINE
Payer: COMMERCIAL

## 2023-07-03 DIAGNOSIS — M25.561 ACUTE PAIN OF RIGHT KNEE: ICD-10-CM

## 2023-07-03 PROCEDURE — 73721 MRI JNT OF LWR EXTRE W/O DYE: CPT

## 2023-10-18 ENCOUNTER — CARE COORDINATION (OUTPATIENT)
Dept: OTHER | Facility: CLINIC | Age: 43
End: 2023-10-18
